# Patient Record
Sex: MALE | Race: WHITE | URBAN - METROPOLITAN AREA
[De-identification: names, ages, dates, MRNs, and addresses within clinical notes are randomized per-mention and may not be internally consistent; named-entity substitution may affect disease eponyms.]

---
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
## 2025-03-12 ENCOUNTER — OFFICE VISIT (OUTPATIENT)
Dept: FAMILY MEDICINE CLINIC | Age: 20
End: 2025-03-12
Payer: COMMERCIAL

## 2025-03-12 VITALS
SYSTOLIC BLOOD PRESSURE: 116 MMHG | WEIGHT: 213.13 LBS | RESPIRATION RATE: 12 BRPM | HEIGHT: 71 IN | TEMPERATURE: 98.8 F | DIASTOLIC BLOOD PRESSURE: 78 MMHG | BODY MASS INDEX: 29.84 KG/M2 | OXYGEN SATURATION: 98 % | HEART RATE: 69 BPM

## 2025-03-12 DIAGNOSIS — F34.1 DYSTHYMIA: ICD-10-CM

## 2025-03-12 DIAGNOSIS — F41.9 ANXIETY: ICD-10-CM

## 2025-03-12 DIAGNOSIS — Z00.00 ANNUAL PHYSICAL EXAM: Primary | ICD-10-CM

## 2025-03-12 PROCEDURE — 99395 PREV VISIT EST AGE 18-39: CPT | Performed by: NURSE PRACTITIONER

## 2025-03-12 SDOH — ECONOMIC STABILITY: FOOD INSECURITY: WITHIN THE PAST 12 MONTHS, YOU WORRIED THAT YOUR FOOD WOULD RUN OUT BEFORE YOU GOT MONEY TO BUY MORE.: NEVER TRUE

## 2025-03-12 SDOH — ECONOMIC STABILITY: FOOD INSECURITY: WITHIN THE PAST 12 MONTHS, THE FOOD YOU BOUGHT JUST DIDN'T LAST AND YOU DIDN'T HAVE MONEY TO GET MORE.: NEVER TRUE

## 2025-03-12 ASSESSMENT — PATIENT HEALTH QUESTIONNAIRE - PHQ9
SUM OF ALL RESPONSES TO PHQ QUESTIONS 1-9: 0
5. POOR APPETITE OR OVEREATING: NOT AT ALL
6. FEELING BAD ABOUT YOURSELF - OR THAT YOU ARE A FAILURE OR HAVE LET YOURSELF OR YOUR FAMILY DOWN: NOT AT ALL
SUM OF ALL RESPONSES TO PHQ QUESTIONS 1-9: 0
4. FEELING TIRED OR HAVING LITTLE ENERGY: NOT AT ALL
9. THOUGHTS THAT YOU WOULD BE BETTER OFF DEAD, OR OF HURTING YOURSELF: NOT AT ALL
10. IF YOU CHECKED OFF ANY PROBLEMS, HOW DIFFICULT HAVE THESE PROBLEMS MADE IT FOR YOU TO DO YOUR WORK, TAKE CARE OF THINGS AT HOME, OR GET ALONG WITH OTHER PEOPLE: NOT DIFFICULT AT ALL
3. TROUBLE FALLING OR STAYING ASLEEP: NOT AT ALL
1. LITTLE INTEREST OR PLEASURE IN DOING THINGS: NOT AT ALL
SUM OF ALL RESPONSES TO PHQ QUESTIONS 1-9: 0
2. FEELING DOWN, DEPRESSED OR HOPELESS: NOT AT ALL
8. MOVING OR SPEAKING SO SLOWLY THAT OTHER PEOPLE COULD HAVE NOTICED. OR THE OPPOSITE, BEING SO FIGETY OR RESTLESS THAT YOU HAVE BEEN MOVING AROUND A LOT MORE THAN USUAL: NOT AT ALL
SUM OF ALL RESPONSES TO PHQ QUESTIONS 1-9: 0
7. TROUBLE CONCENTRATING ON THINGS, SUCH AS READING THE NEWSPAPER OR WATCHING TELEVISION: NOT AT ALL

## 2025-03-12 NOTE — PROGRESS NOTES
History and Physical      Es Salomon  YOB: 2005    Date of Service:  3/12/2025    Chief Complaint:   Es Salomon is a 19 y.o. male who presents for complete physical examination.    HPI: Presents to clinic today for annual physical exam and follow up on chronic health conditions.    Depression/Anxiety  Generally speaking feels he is doing well.  Has been doing well with energy.     Diet: Good - eating fruits and vegetables daily.  Working on limiting junk and sugar.   Exercise: Hasn't been exercising regularly - does do some skate boarding.  Has plans to return to the gym.    Occupation: [employer] -  - enjoys job in general - part time.  Working on new job.   Hobbies: Go to the gym, basketball, outdoor activities, walks, video game.   Family life: Single, no children, 1 dog, 1 cat, lives in house - lives with parents and brother - good living situation.  Low stress.  No concerns for anxiety or depression.   Last eye exam: Unsure - normal.    Last dental exam: 2024    Preventive Care:  Health Maintenance   Topic Date Due    HPV vaccine (1 - Male 3-dose series) Never done    HIV screen  Never done    Meningococcal B vaccine (1 of 2 - Standard) Never done    Hepatitis C screen  Never done    Flu vaccine (1) Never done    Depression Monitoring  03/01/2025    COVID-19 Vaccine (3 - 2024-25 season) 03/12/2026 (Originally 9/1/2024)    DTaP/Tdap/Td vaccine (7 - Td or Tdap) 07/15/2027    Hepatitis B vaccine  Completed    Hib vaccine  Completed    Polio vaccine  Completed    Varicella vaccine  Completed    Meningococcal (ACWY) vaccine  Completed    Pneumococcal 0-49 years Vaccine  Completed    Hepatitis A vaccine  Aged Out    Measles,Mumps,Rubella (MMR) vaccine  Discontinued        Family History:  Colon Cancer: None  Thyroid Cancer/Disease: None  Melanoma:  None  Prostate Cancer: None  Testicular Cancer: None     Father - bladder cancer - dx 69.        Preventive plan of care for Es